# Patient Record
Sex: FEMALE | Race: WHITE
[De-identification: names, ages, dates, MRNs, and addresses within clinical notes are randomized per-mention and may not be internally consistent; named-entity substitution may affect disease eponyms.]

---

## 2018-01-30 ENCOUNTER — HOSPITAL ENCOUNTER (OUTPATIENT)
Dept: HOSPITAL 47 - RADXRYALE | Age: 53
Discharge: HOME | End: 2018-01-30
Attending: PHYSICIAN ASSISTANT
Payer: COMMERCIAL

## 2018-01-30 ENCOUNTER — HOSPITAL ENCOUNTER (OUTPATIENT)
Dept: HOSPITAL 47 - RADCTMAIN | Age: 53
Discharge: HOME | End: 2018-01-30
Attending: PHYSICIAN ASSISTANT
Payer: COMMERCIAL

## 2018-01-30 DIAGNOSIS — K76.89: ICD-10-CM

## 2018-01-30 DIAGNOSIS — Z87.442: ICD-10-CM

## 2018-01-30 DIAGNOSIS — R31.9: Primary | ICD-10-CM

## 2018-01-30 DIAGNOSIS — K59.00: ICD-10-CM

## 2018-01-30 DIAGNOSIS — N20.0: Primary | ICD-10-CM

## 2018-01-30 PROCEDURE — 74176 CT ABD & PELVIS W/O CONTRAST: CPT

## 2018-01-30 PROCEDURE — 74019 RADEX ABDOMEN 2 VIEWS: CPT

## 2018-01-30 NOTE — XR
Abdomen 2 view

 

HISTORY: Hematuria

 

2 views of the abdomen correlated to CT same day

 

There is a large amount of retained fecal debris. Bone mineralization is maintained. No bowel obstruc
tion or pneumoperitoneum. Calcification in the right kidney is likely obscured by overlying bowel gas
. Lung bases are clear.

 

IMPRESSION: Correlate for fecal stasis.

## 2018-01-30 NOTE — CT
EXAMINATION TYPE: CT abdomen pelvis wo con

 

DATE OF EXAM: 1/30/2018

 

COMPARISON: NONE

 

HISTORY: Low back pain and hematuria. History of renal stones.

 

CT DLP: 981 mGycm

Automated exposure control for dose reduction was used.

 

TECHNIQUE:  Helical acquisition of images was performed from the lung bases through the pelvis.

 

FINDINGS: 

 

LUNG BASES: No significant abnormality is appreciated.

 

LIVER/GB: 9 mm lesion within segment 8 of the liver is seen in a subcapsular location on series 3 koko
ge 15 and 16. This does not measure Hounsfield units of simple fluid and cannot be classified as a si
mple cyst on this noncontrast exam.

 

PANCREAS: No significant abnormality is seen.

 

SPLEEN: No significant abnormality is seen.

 

ADRENALS: No significant abnormality is seen.

 

KIDNEYS: Well-defined cystic fluid attenuated renal lesions are seen within the right midpole. 2 mm n
onobstructing right inferior pole renal calculus is noted. No evidence of hydronephrosis or nephrolit
hiasis on the left. No hydronephrosis on the right. 5 mm too small to accurately characterize left up
per pole exophytic renal lesion is seen.

 

FREE AIR:  No free air is visualized

 

ADENOPATHY:  No greater than 1 cm short axis lymph nodes are seen within the abdomen or pelvis.

 

REPRODUCTIVE ORGANS: Uterus appears surgically absent.

 

URINARY BLADDER:  No significant abnormality is seen.

 

OSSEOUS STRUCTURES:  Nonspecific 6 mm sclerotic focus is seen within the left iliac bone, possibly re
lated to a bone island.

 

BOWEL:  There is an incidentally noted descending duodenal diverticulum. Moderate amount retained col
onic stool is seen within the nondilated colon. Appendix is air-filled and within normal limits. No e
vidence of dilated bowel. Small bowel is unremarkable given the limitation of lack of oral contrast.

 

OTHER: Fat filled umbilical hernia is small.

 

IMPRESSION: 

1. NO EVIDENCE OF HYDRONEPHROSIS OR OBSTRUCTIVE UROPATHY.

2. NONOBSTRUCTING 2 MM RIGHT INFERIOR POLE RENAL CALCULUS.

3. MODERATE AMOUNT RETAINED COLONIC STOOL WITHIN NONDILATED BOWEL.

4. 9 MM HEPATIC LESION THAT IS INCOMPLETELY CHARACTERIZED BUT DOES NOT FIT CRITERIA FOR A CYST ON TOD
AY'S EXAMINATION. FURTHER CHARACTERIZATION WITH DYNAMIC THREE-PHASE ENHANCED ABDOMEN CT COULD BE PERF
ORMED FOR FURTHER CLASSIFICATION.

## 2018-02-08 ENCOUNTER — HOSPITAL ENCOUNTER (OUTPATIENT)
Dept: HOSPITAL 47 - RADUSWWP | Age: 53
Discharge: HOME | End: 2018-02-08
Payer: COMMERCIAL

## 2018-02-08 DIAGNOSIS — K76.89: Primary | ICD-10-CM

## 2018-02-08 PROCEDURE — 76700 US EXAM ABDOM COMPLETE: CPT

## 2018-02-08 NOTE — US
EXAMINATION TYPE: US abdomen complete

 

DATE OF EXAM: 2/8/2018

 

COMPARISON: CT abdomen and pelvis January 30, 2018

 

CLINICAL HISTORY: R9.32 abnormal findings on dx liver test.

 

EXAM MEASUREMENTS:

 

Liver Length:  15.2 cm   

Gallbladder Wall:  0.3 cm   

CBD:  0.2 cm

Spleen:  9.2 cm   

Right Kidney:  9.7x 4.6 x 4.9  cm 

Left Kidney:  9.5 x 4.7 x 4.6  cm   

 

 

 

Pancreas:  Obscured by bowel gas

Liver:  wnl  

Gallbladder: no stones

**Evidence for sonographic Germain's sign:  No

CBD:  wnl 

Spleen:  wnl   

Right Kidney:  

Left Kidney:  small upper pole cyst 0.5 x 0.3 x 0.3 cm, lower pole cysts 1.4 x 0.6  x 1.2 cm,   1.2 x
 0.8 x 1.5 cm

Upper IVC:  wnl  

Abd Aorta:  wnl

 

 

 

The visualized liver is slightly heterogeneous. Subcentimeter hypodense lesion hepatic dome on recent
 CT is clearly identified on ultrasound images saved. The intrahepatic portion of the IVC and visuali
zed abdominal aorta are within normal limits.  There is no evidence of cholelithiasis.  Common bile d
uct is unremarkable.  The pancreas is suboptimally evaluated as it is obscured by overlying bowel gas
 on images saved. The spleen is unremarkable.  Kidneys are symmetric and free of hydronephrosis.  No 
suspicious solid or cystic renal lesions are seen. Tiny cysts are marked in left kidney.

 

IMPRESSION: Heterogeneous liver without worrisome intrahepatic mass or intrahepatic ductal dilatation
. Suspect single 2 mm calculi in both kidneys on recent CT coronal image 58 and 69 lower pole levels.

## 2018-10-01 ENCOUNTER — HOSPITAL ENCOUNTER (OUTPATIENT)
Dept: HOSPITAL 47 - RADXRYALE | Age: 53
Discharge: HOME | End: 2018-10-01
Attending: PHYSICIAN ASSISTANT
Payer: COMMERCIAL

## 2018-10-01 DIAGNOSIS — M79.672: Primary | ICD-10-CM

## 2018-10-01 NOTE — XR
EXAMINATION TYPE: XR foot complete LT

 

DATE OF EXAM: 10/1/2018

 

COMPARISON: NONE

 

HISTORY: Pain and swelling

 

TECHNIQUE: Three views are submitted.

 

FINDINGS:

There is no oblique fracture through the shaft of the proximal phalanx third digit with mild displace
ment. Arthropathy of the first MTP joint. Tiny plantar calcaneal spur.

 

IMPRESSION:

1. There is no oblique fracture proximal phalanx third digit.

 

A Yellow level critical message alert has been initiated for Pattie Barger via the [facility] System on 10/1/2018 2:23 PM.  This message alert has been sent to Pattie Barger via the preferences provided by the clinician for the receipt of Radiology Critical Fin
dings. Message ID [id number].

## 2020-10-27 ENCOUNTER — HOSPITAL ENCOUNTER (OUTPATIENT)
Dept: HOSPITAL 47 - LABWHC1 | Age: 55
Discharge: HOME | End: 2020-10-27
Attending: FAMILY MEDICINE
Payer: SELF-PAY

## 2020-10-27 DIAGNOSIS — R53.83: ICD-10-CM

## 2020-10-27 DIAGNOSIS — R05: Primary | ICD-10-CM

## 2020-10-27 DIAGNOSIS — R43.9: ICD-10-CM
